# Patient Record
Sex: FEMALE | Race: WHITE | Employment: UNEMPLOYED | URBAN - METROPOLITAN AREA
[De-identification: names, ages, dates, MRNs, and addresses within clinical notes are randomized per-mention and may not be internally consistent; named-entity substitution may affect disease eponyms.]

---

## 2019-02-05 ENCOUNTER — HOSPITAL ENCOUNTER (OUTPATIENT)
Age: 84
Discharge: HOME | End: 2019-02-07
Payer: COMMERCIAL

## 2019-02-05 ENCOUNTER — HOSPITAL ENCOUNTER (OUTPATIENT)
Dept: GENERAL RADIOLOGY | Age: 84
Discharge: HOME | End: 2019-02-07
Payer: COMMERCIAL

## 2019-02-05 ENCOUNTER — HOSPITAL ENCOUNTER (OUTPATIENT)
Age: 84
Discharge: HOME | End: 2019-02-05
Payer: COMMERCIAL

## 2019-02-05 DIAGNOSIS — R52 PAIN: ICD-10-CM

## 2019-02-05 LAB
C-REACTIVE PROTEIN: 0.3 MG/DL (ref 0–0.4)
SEDIMENTATION RATE, ERYTHROCYTE: 28 MM/HR (ref 0–20)

## 2019-02-05 PROCEDURE — 36415 COLL VENOUS BLD VENIPUNCTURE: CPT

## 2019-02-05 PROCEDURE — 73502 X-RAY EXAM HIP UNI 2-3 VIEWS: CPT

## 2019-02-05 PROCEDURE — 83018 HEAVY METAL QUAN EACH NES: CPT

## 2019-02-05 PROCEDURE — 85651 RBC SED RATE NONAUTOMATED: CPT

## 2019-02-05 PROCEDURE — 86140 C-REACTIVE PROTEIN: CPT

## 2019-02-05 PROCEDURE — 82495 ASSAY OF CHROMIUM: CPT

## 2019-02-07 LAB — CHROMIUM, SERUM: 1 UG/L

## 2019-02-09 LAB — COBALT, PLASMA: <1 UG/L

## 2019-02-18 LAB
Lab: NORMAL
REPORT: NORMAL
THIS TEST SENT TO: NORMAL

## 2021-01-01 ENCOUNTER — HOSPITAL ENCOUNTER (EMERGENCY)
Age: 86
Discharge: HOME | End: 2021-10-11
Attending: EMERGENCY MEDICINE
Payer: COMMERCIAL

## 2021-01-01 VITALS
HEART RATE: 73 BPM | RESPIRATION RATE: 18 BRPM | SYSTOLIC BLOOD PRESSURE: 139 MMHG | TEMPERATURE: 97.8 F | OXYGEN SATURATION: 97 % | WEIGHT: 230 LBS | BODY MASS INDEX: 39.27 KG/M2 | HEIGHT: 64 IN | DIASTOLIC BLOOD PRESSURE: 66 MMHG

## 2021-01-01 DIAGNOSIS — G51.0 BELL'S PALSY: Primary | ICD-10-CM

## 2021-01-01 PROCEDURE — 6370000000 HC RX 637 (ALT 250 FOR IP): Performed by: STUDENT IN AN ORGANIZED HEALTH CARE EDUCATION/TRAINING PROGRAM

## 2021-01-01 PROCEDURE — 99284 EMERGENCY DEPT VISIT MOD MDM: CPT

## 2021-01-01 RX ORDER — ACYCLOVIR 200 MG/1
400 CAPSULE ORAL
Qty: 100 CAPSULE | Refills: 0 | Status: SHIPPED | OUTPATIENT
Start: 2021-01-01 | End: 2021-01-01

## 2021-01-01 RX ORDER — MINERAL OIL AND WHITE PETROLATUM 150; 830 MG/G; MG/G
OINTMENT OPHTHALMIC PRN
Status: DISCONTINUED | OUTPATIENT
Start: 2021-01-01 | End: 2021-01-01 | Stop reason: HOSPADM

## 2021-01-01 RX ORDER — VALACYCLOVIR HYDROCHLORIDE 500 MG/1
1000 TABLET, FILM COATED ORAL ONCE
Status: DISCONTINUED | OUTPATIENT
Start: 2021-01-01 | End: 2021-01-01 | Stop reason: HOSPADM

## 2021-01-01 RX ORDER — VALACYCLOVIR HYDROCHLORIDE 1 G/1
1000 TABLET, FILM COATED ORAL 3 TIMES DAILY
Qty: 21 TABLET | Refills: 0 | Status: SHIPPED | OUTPATIENT
Start: 2021-01-01 | End: 2021-01-01

## 2021-01-01 RX ORDER — PREDNISONE 20 MG/1
60 TABLET ORAL ONCE
Status: COMPLETED | OUTPATIENT
Start: 2021-01-01 | End: 2021-01-01

## 2021-01-01 RX ORDER — PREDNISONE 10 MG/1
10 TABLET ORAL DAILY
Qty: 7 TABLET | Refills: 0 | Status: SHIPPED | OUTPATIENT
Start: 2021-01-01 | End: 2021-01-01

## 2021-01-01 RX ORDER — PREDNISONE 20 MG/1
60 TABLET ORAL DAILY
Qty: 21 TABLET | Refills: 0 | Status: SHIPPED | OUTPATIENT
Start: 2021-01-01 | End: 2021-01-01

## 2021-01-01 RX ADMIN — PREDNISONE 60 MG: 20 TABLET ORAL at 11:05

## 2021-01-01 ASSESSMENT — ENCOUNTER SYMPTOMS
BACK PAIN: 0
ABDOMINAL DISTENTION: 0
WHEEZING: 0
SINUS PRESSURE: 0
DIARRHEA: 0
NAUSEA: 0
COLOR CHANGE: 0
COUGH: 0
VOMITING: 0
SHORTNESS OF BREATH: 0
SORE THROAT: 0
CONSTIPATION: 0
ABDOMINAL PAIN: 0

## 2021-10-11 NOTE — ED PROVIDER NOTES
Exam  Vitals and nursing note reviewed. Constitutional:       General: She is not in acute distress. Appearance: Normal appearance. She is not ill-appearing or diaphoretic. HENT:      Head: Normocephalic and atraumatic. Right Ear: Tympanic membrane, ear canal and external ear normal. There is impacted cerumen. Left Ear: Tympanic membrane, ear canal and external ear normal.      Ears:      Comments: No visible erythematous lesions or rashes. Nose: Nose normal.   Eyes:      Extraocular Movements: Extraocular movements intact. Pupils: Pupils are equal, round, and reactive to light. Cardiovascular:      Rate and Rhythm: Normal rate and regular rhythm. Pulses: Normal pulses. Heart sounds: Normal heart sounds. Pulmonary:      Effort: Pulmonary effort is normal.      Breath sounds: Normal breath sounds. Abdominal:      General: Bowel sounds are normal.      Palpations: Abdomen is soft. Tenderness: There is no abdominal tenderness. There is no right CVA tenderness, left CVA tenderness or rebound. Negative signs include Damico's sign, Rovsing's sign and McBurney's sign. Musculoskeletal:         General: Normal range of motion. Cervical back: Normal range of motion. Skin:     General: Skin is warm and dry. Capillary Refill: Capillary refill takes less than 2 seconds. Neurological:      General: No focal deficit present. Mental Status: She is alert and oriented to person, place, and time. GCS: GCS eye subscore is 4. GCS verbal subscore is 5. GCS motor subscore is 6. Cranial Nerves: Facial asymmetry present. Sensory: Sensation is intact. Motor: No weakness or tremor. Coordination: Coordination is intact.  Coordination normal.   Psychiatric:         Mood and Affect: Mood normal.          Procedures     MDM  Number of Diagnoses or Management Options  Bell's palsy  Diagnosis management comments: 67-year-old female no pertinent past medical history presents with concerns for CVA. Patient states that the symptoms started 2 days ago. And have stayed constant. Vitals within normal limits. On physical exam patient is alert and oriented x3. Lungs clear to auscultation bilaterally. No wheezes rales rhonchi appreciated. Abdomen soft nontender no rebound or guarding. Cranial nerves II through XII grossly intact. No dysmetria no dysarthria no dysdiadochokinesia. Patient has complete right facial palsy. This is consistent with Bell's palsy. She is unable to close her right eye. Patient given prednisone, acyclovir, IV lubrication, and an eye patch for the right eye. Patient informed of all the results and evaluation she is agreeable plan for discharge back to her facility. Patient is awake alert, hemodynamic stable, afebrile and in no respiratory distress. Discussed with patient plan for close outpatient follow-up with the patient's PCP as well as return precautions and the patient understands and agrees to the plan. Patient was seen with attending.                   --------------------------------------------- PAST HISTORY ---------------------------------------------  Past Medical History:  has a past medical history of Arthritis, CAD (coronary artery disease), [facility] Depression, PAT (dyspnea on exertion), Hypertension, and Incontinence of urine. Past Surgical History:  has a past surgical history that includes Breast biopsy (Right, 7/5/13); Ankle surgery; Elbow surgery; Tonsillectomy; Cardiac surgery (2011); hip surgery (2008); fracture surgery (2008); other surgical history (8/27/13); and other surgical history (Right, 8/28/13). Social History:  reports that she has never smoked. She has never used smokeless tobacco. She reports current alcohol use of about 1.0 standard drinks of alcohol per week. She reports that she does not use drugs.     Family History: family history includes Alcohol Abuse in her father; Heart Attack in her mother; Heart Disease in her mother. The patients home medications have been reviewed. Allergies: Patient has no known allergies. -------------------------------------------------- RESULTS -------------------------------------------------  Labs:  No results found for this visit on 10/11/21. ECG:  Please refer to workup tab for EKG read    Radiology:  No orders to display       ------------------------- NURSING NOTES AND VITALS REVIEWED ---------------------------  Date / Time Roomed:  10/11/2021 10:08 AM  ED Bed Assignment:  19/19    The nursing notes within the ED encounter and vital signs as below have been reviewed. /66   Pulse 73   Temp 97.8 °F (36.6 °C)   Resp 18   Ht 5' 4\" (1.626 m)   Wt 230 lb (104.3 kg)   SpO2 97%   BMI 39.48 kg/m²   Oxygen Saturation Interpretation: normal      ------------------------------------------ PROGRESS NOTES ------------------------------------------    I have spoken with the patient and discussed todays results, in addition to providing specific details for the plan of care and counseling regarding the diagnosis and prognosis. Their questions are answered at this time and they are agreeable with the plan. I discussed at length with them reasons for immediate return here for re evaluation. They will followup with their PCP by calling their office tomorrow. --------------------------------- ADDITIONAL PROVIDER NOTES ---------------------------------  At this time the patient is without objective evidence of an acute process requiring hospitalization or inpatient management. They have remained hemodynamically stable throughout their entire ED visit and are stable for discharge with outpatient follow-up. The plan has been discussed in detail and they are aware of the specific conditions for emergent return, as well as the importance of follow-up.       Discharge Medication List as of 10/11/2021 11:35 AM      START taking these medications    Details   ! ! predniSONE (DELTASONE) 20 MG tablet Take 3 tablets by mouth daily for 7 days, Disp-21 tablet, R-0Print      !! predniSONE (DELTASONE) 10 MG tablet Take 1 tablet by mouth daily for 7 days, Disp-7 tablet, R-0Print      acyclovir (ZOVIRAX) 200 MG capsule Take 2 capsules by mouth 5 times daily for 10 days, Disp-100 capsule, R-0Print       !! - Potential duplicate medications found. Please discuss with provider. Diagnosis:  1. Bell's palsy        Disposition:  Patient's disposition: Discharge to nursing facility  Patient's condition is stable.         Vesna Quintero MD  Resident  10/11/21 0011

## 2021-10-11 NOTE — ED NOTES
Bed: 19  Expected date:   Expected time:   Means of arrival:   Comments:  germain Goldman RN  10/11/21 9732